# Patient Record
Sex: MALE | Race: BLACK OR AFRICAN AMERICAN
[De-identification: names, ages, dates, MRNs, and addresses within clinical notes are randomized per-mention and may not be internally consistent; named-entity substitution may affect disease eponyms.]

---

## 2018-05-22 ENCOUNTER — HOSPITAL ENCOUNTER (OUTPATIENT)
Dept: HOSPITAL 74 - JER | Age: 58
Setting detail: OBSERVATION
LOS: 3 days | Discharge: HOME | End: 2018-05-25
Attending: INTERNAL MEDICINE | Admitting: INTERNAL MEDICINE
Payer: COMMERCIAL

## 2018-05-22 VITALS — BODY MASS INDEX: 34.9 KG/M2

## 2018-05-22 DIAGNOSIS — G89.29: ICD-10-CM

## 2018-05-22 DIAGNOSIS — I10: ICD-10-CM

## 2018-05-22 DIAGNOSIS — N17.9: ICD-10-CM

## 2018-05-22 DIAGNOSIS — F10.129: ICD-10-CM

## 2018-05-22 DIAGNOSIS — M62.82: ICD-10-CM

## 2018-05-22 DIAGNOSIS — D64.9: ICD-10-CM

## 2018-05-22 DIAGNOSIS — E87.0: ICD-10-CM

## 2018-05-22 DIAGNOSIS — Z91.14: ICD-10-CM

## 2018-05-22 DIAGNOSIS — Z88.8: ICD-10-CM

## 2018-05-22 DIAGNOSIS — R55: ICD-10-CM

## 2018-05-22 DIAGNOSIS — R07.9: Primary | ICD-10-CM

## 2018-05-22 DIAGNOSIS — Z79.891: ICD-10-CM

## 2018-05-22 LAB
ALBUMIN SERPL-MCNC: 3.6 G/DL (ref 3.4–5)
ALP SERPL-CCNC: 92 U/L (ref 45–117)
ALT SERPL-CCNC: 47 U/L (ref 12–78)
ANION GAP SERPL CALC-SCNC: 9 MMOL/L (ref 8–16)
APPEARANCE UR: (no result)
AST SERPL-CCNC: 67 U/L (ref 15–37)
BASOPHILS # BLD: 1.1 % (ref 0–2)
BILIRUB SERPL-MCNC: 0.2 MG/DL (ref 0.2–1)
BILIRUB UR STRIP.AUTO-MCNC: NEGATIVE MG/DL
BNP SERPL-MCNC: 152 PG/ML (ref 5–125)
BUN SERPL-MCNC: 16 MG/DL (ref 7–18)
CALCIUM SERPL-MCNC: 8.1 MG/DL (ref 8.5–10.1)
CHLORIDE SERPL-SCNC: 114 MMOL/L (ref 98–107)
CO2 SERPL-SCNC: 26 MMOL/L (ref 21–32)
COLOR UR: (no result)
CREAT SERPL-MCNC: 1.7 MG/DL (ref 0.7–1.3)
DEPRECATED RDW RBC AUTO: 13.9 % (ref 11.9–15.9)
EOSINOPHIL # BLD: 1.4 % (ref 0–4.5)
GLUCOSE SERPL-MCNC: 92 MG/DL (ref 74–106)
HCT VFR BLD CALC: 44.2 % (ref 35.4–49)
HGB BLD-MCNC: 14.7 GM/DL (ref 11.7–16.9)
INR BLD: 0.91 (ref 0.82–1.09)
KETONES UR QL STRIP: NEGATIVE
LEUKOCYTE ESTERASE UR QL STRIP.AUTO: NEGATIVE
LYMPHOCYTES # BLD: 43.6 % (ref 8–40)
MAGNESIUM SERPL-MCNC: 2.2 MG/DL (ref 1.8–2.4)
MCH RBC QN AUTO: 27.6 PG (ref 25.7–33.7)
MCHC RBC AUTO-ENTMCNC: 33.2 G/DL (ref 32–35.9)
MCV RBC: 83.2 FL (ref 80–96)
MONOCYTES # BLD AUTO: 7.8 % (ref 3.8–10.2)
MUCOUS THREADS URNS QL MICRO: (no result)
NEUTROPHILS # BLD: 46.1 % (ref 42.8–82.8)
NITRITE UR QL STRIP: NEGATIVE
PH UR: 5 [PH] (ref 5–8)
PLATELET # BLD AUTO: 221 K/MM3 (ref 134–434)
PMV BLD: 11 FL (ref 7.5–11.1)
POTASSIUM SERPLBLD-SCNC: 3.9 MMOL/L (ref 3.5–5.1)
PROT SERPL-MCNC: 7.8 G/DL (ref 6.4–8.2)
PROT UR QL STRIP: (no result)
PROT UR QL STRIP: (no result)
PT PNL PPP: 10.3 SEC (ref 9.7–13)
RBC # BLD AUTO: 5.31 M/MM3 (ref 4–5.6)
SODIUM SERPL-SCNC: 149 MMOL/L (ref 136–145)
SP GR UR: 1.02 (ref 1–1.03)
UROBILINOGEN UR STRIP-MCNC: NEGATIVE MG/DL (ref 0.2–1)
WBC # BLD AUTO: 6 K/MM3 (ref 4–10)

## 2018-05-22 PROCEDURE — A9502 TC99M TETROFOSMIN: HCPCS

## 2018-05-22 PROCEDURE — 3E0337Z INTRODUCTION OF ELECTROLYTIC AND WATER BALANCE SUBSTANCE INTO PERIPHERAL VEIN, PERCUTANEOUS APPROACH: ICD-10-PCS | Performed by: INTERNAL MEDICINE

## 2018-05-22 PROCEDURE — 3E033GC INTRODUCTION OF OTHER THERAPEUTIC SUBSTANCE INTO PERIPHERAL VEIN, PERCUTANEOUS APPROACH: ICD-10-PCS | Performed by: INTERNAL MEDICINE

## 2018-05-22 PROCEDURE — G0378 HOSPITAL OBSERVATION PER HR: HCPCS

## 2018-05-22 RX ADMIN — METOPROLOL TARTRATE SCH MG: 50 TABLET, FILM COATED ORAL at 23:22
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
## 2018-05-22 NOTE — PDOC
History of Present Illness





- General


History Source: Patient


Exam Limitations: No Limitations





- History of Present Illness


Initial Comments: 





05/22/18 20:11


The patient is a 57 year old male, with a significant past medical history of 

HTN (noncompliant with medication) and anemia, who presents to the emergency 

department with, s/p 2 episodes of syncope while standing with associated chest 

pain. The patient reports associated chest pain with his second episode of 

syncope. He reports associated shortness of breath associated with both 

episodes of syncope. As per patient, he has not been feeling well for the past 

4 days.  He reports consuming alcohol the past two days after a friend advised 

him to. He denies consuming alcohol today. The patient reports to be 

noncompliant with his HTN medication because he believes it makes him feel 

lightheaded.  





He denies any recent fevers, chills, or headache. He denies any recent nausea, 

vomit, diarrhea or constipation. He denies any recent dysuria, frequency, 

urgency or hematuria.





Allergies: Morphine, Nitroglycerin, Tramadol, 


Social History: Nonsmoker. Denies recreational drug use. 








<Kenya Mathews - Last Filed: 05/22/18 20:30>





- General


History Source: Patient





<Mike Lemons - Last Filed: 05/22/18 22:40>





- General


Chief Complaint: Chest Pain


Stated Complaint: CHEST PAIN


Time Seen by Provider: 05/22/18 19:48





Past History





<Kenya Mathews - Last Filed: 05/22/18 20:30>





- Past Medical History


Anemia: Yes (2 TRANSFUSIONS)


COPD: No


HTN: Yes





- Immunization History


Immunization Up to Date: Yes





- Suicide/Smoking/Psychosocial Hx


Smoking Status: No


Smoking History: Never smoked


Have you smoked in the past 12 months: No


Number of Cigarettes Smoked Daily: 0


Hx Alcohol Use: Yes


Drug/Substance Use Hx: No


Substance Use Type: Alcohol, Opiates, Prescribed


Hx Substance Use Treatment: No





<Mike Lemons - Last Filed: 05/22/18 22:40>





- Past Medical History


Allergies/Adverse Reactions: 


 Allergies











Allergy/AdvReac Type Severity Reaction Status Date / Time


 


morphine Allergy   Verified 05/22/18 19:21


 


nitroglycerin Allergy   Verified 05/22/18 19:21


 


tramadol Allergy   Verified 05/22/18 19:21











Home Medications: 


Ambulatory Orders





Clonidine HCl 0 mg PO BID 04/19/16 


Hydrochlorothiazide [Hctz -] 0 mg PO DAILY 04/19/16 


Metoprolol Tartrate [Lopressor -] 50 mg PO BID 04/19/16 


Oxymorphone HCl [Opana ER] 40 mg PO BID 04/19/16 


oxyCODONE SR [Oxycontin] 15 mg PO TID 04/19/16 











**Review of Systems





- Review of Systems


Able to Perform ROS?: Yes


Comments:: 





05/22/18 20:11


CONSTITUTIONAL:


Absent: fever, no chills, no fatigue


EYES:


Absent: visual changes


ENT:


Absent: ear pain, no sore throat


CARDIOVASCULAR:


Present: Chest Pain.


Absent: no palpitations


RESPIRATORY:


Present: SOB


Absent: cough


GI:


Absent: abdominal pain, no nausea, no vomiting, no constipation, no diarrhea


GENITOURINARY:


Absent: dysuria, no frequency, no hematuria


MUSKULOSKELETAL:


Absent: back pain, no arthralgia, no myalgia


SKIN:


Absent: rash


NEURO:


Present: 2 Episodes of Syncope.


Absent: headache








<Kenya Mathews - Last Filed: 05/22/18 20:30>





*Physical Exam





- Vital Signs


 Last Vital Signs











Temp Pulse Resp BP Pulse Ox


 


 98.1 F   92 H  22   138/87   99 


 


 05/22/18 19:17  05/22/18 19:17  05/22/18 19:17  05/22/18 19:17  05/22/18 19:17














- Physical Exam


Comments: 





05/22/18 20:11


GENERAL: 


Well-appearing, well-nourished. No apparent distress.


HEENT: 


Normocephalic, atraumatic. PERRL, EOM intact.


CARDIOVASCULAR: 


Normal S1, S2. Regular rate and rhythm.


PULMONARY: 


Clear to auscultation bilaterally.


ABDOMEN: 


Soft, non-distended, non-tender. 


+EXTREMITIES: 


+1 pitting edema of the lower extremities. Normal ROM in all four extremities. 


SKIN: 


Warm, dry.  No rash


NEUROLOGICAL: 


Alert, awake, appropriate. Cranial nerves 2-12 intact. No deficits to light 

touch and temperature in face, upper extremities and lower extremities. No 

motor deficits in the in face, upper extremities and lower extremities. No 

pronator drift.  Normoreflexic in the upper and lower extremities. Normal 

speech. Toes are down-going bilaterally.














<Kenya Mathews - Last Filed: 05/22/18 20:30>





- Vital Signs


 Last Vital Signs











Temp Pulse Resp BP Pulse Ox


 


 98.1 F   92 H  22   138/87   99 


 


 05/22/18 19:17  05/22/18 19:17  05/22/18 19:17  05/22/18 19:17  05/22/18 19:17














<Mike Lemons - Last Filed: 05/22/18 22:40>





Moderate Sedation





- Procedure Monitoring


Vital Signs: 


 Vital Signs











Temp Pulse Resp BP Pulse Ox


 


 98.1 F   92 H  22   138/87   99 


 


 05/22/18 19:17  05/22/18 19:17  05/22/18 19:17  05/22/18 19:17  05/22/18 19:17














<Kenya Mathews - Last Filed: 05/22/18 20:30>





- Procedure Monitoring


Vital Signs: 


 Vital Signs











Temp Pulse Resp BP Pulse Ox


 


 98.1 F   92 H  22   138/87   99 


 


 05/22/18 19:17  05/22/18 19:17  05/22/18 19:17  05/22/18 19:17  05/22/18 19:17














<Mike Lemons - Last Filed: 05/22/18 22:40>





**Heart Score/ECG Review





- ECG Intrepretation


Comment:: 





05/22/18 20:12


EKG performed at: 22 May 2018  19:26:22





Vent Rate 90 bpm


ME interval  136 ms


QRS duration  98 ms 


QT/QTc  382/467 ms


P-R-T axes  47  51  -6








<Kenya Mathews - Last Filed: 05/22/18 20:30>





ED Treatment Course





- LABORATORY


CBC & Chemistry Diagram: 


 05/22/18 21:13





 05/22/18 21:13





<Mike Lemons - Last Filed: 05/22/18 22:40>





Medical Decision Making





- Medical Decision Making





05/22/18 22:36


Dr. Lemons: The scribe's documentation has been prepared under my direction 

and personally reviewed by me in its entirery. I confirm that the note above 

accurately reflects all work, treatment, procedures, and medical decision 

making performed by me.





Pt states feeling slightly better than before.  troponin x 1 negative at this 

time.  Pt recently returned from [place] and hasn't see a doctor in some 

time.  Will admit Tele Obs





<Mike Lemons - Last Filed: 05/22/18 22:40>





*DC/Admit/Observation/Transfer





- Attestations


Scribe Attestion: 





05/22/18 20:13





Documentation prepared by Kenya Mathews, acting as medical scribe for Mike Lemons DO.





<Kenya Mathews - Last Filed: 05/22/18 20:30>





- Discharge Dispostion


Decision to Admit order: Yes





<Mike Lemons - Last Filed: 05/22/18 22:40>


Diagnosis at time of Disposition: 


 Chest pain, Alcohol intoxication








- Discharge Dispostion


Condition at time of disposition: Stable
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
## 2018-05-23 LAB
ANION GAP SERPL CALC-SCNC: 9 MMOL/L (ref 8–16)
BUN SERPL-MCNC: 16 MG/DL (ref 7–18)
CALCIUM SERPL-MCNC: 8.1 MG/DL (ref 8.5–10.1)
CHLORIDE SERPL-SCNC: 112 MMOL/L (ref 98–107)
CO2 SERPL-SCNC: 25 MMOL/L (ref 21–32)
CREAT SERPL-MCNC: 1.3 MG/DL (ref 0.7–1.3)
DEPRECATED RDW RBC AUTO: 13.8 % (ref 11.9–15.9)
GLUCOSE SERPL-MCNC: 96 MG/DL (ref 74–106)
HCT VFR BLD CALC: 43.9 % (ref 35.4–49)
HGB BLD-MCNC: 14.4 GM/DL (ref 11.7–16.9)
MAGNESIUM SERPL-MCNC: 2 MG/DL (ref 1.8–2.4)
MCH RBC QN AUTO: 27.4 PG (ref 25.7–33.7)
MCHC RBC AUTO-ENTMCNC: 32.8 G/DL (ref 32–35.9)
MCV RBC: 83.4 FL (ref 80–96)
PHOSPHATE SERPL-MCNC: 2.9 MG/DL (ref 2.5–4.9)
PLATELET # BLD AUTO: 192 K/MM3 (ref 134–434)
PMV BLD: 10.1 FL (ref 7.5–11.1)
POTASSIUM SERPLBLD-SCNC: 3.8 MMOL/L (ref 3.5–5.1)
RBC # BLD AUTO: 5.27 M/MM3 (ref 4–5.6)
SODIUM SERPL-SCNC: 146 MMOL/L (ref 136–145)
WBC # BLD AUTO: 5.1 K/MM3 (ref 4–10)

## 2018-05-23 RX ADMIN — CYANOCOBALAMIN TAB 1000 MCG SCH MCG: 1000 TAB at 11:44

## 2018-05-23 RX ADMIN — Medication SCH MG: at 23:16

## 2018-05-23 RX ADMIN — AMLODIPINE BESYLATE SCH MG: 5 TABLET ORAL at 11:45

## 2018-05-23 RX ADMIN — Medication SCH MG: at 11:43

## 2018-05-23 RX ADMIN — FOLIC ACID SCH MG: 1 TABLET ORAL at 11:43

## 2018-05-23 RX ADMIN — METOPROLOL TARTRATE SCH MG: 50 TABLET, FILM COATED ORAL at 11:43

## 2018-05-23 RX ADMIN — METOPROLOL TARTRATE SCH MG: 50 TABLET, FILM COATED ORAL at 21:59

## 2018-05-23 RX ADMIN — OXYCODONE HYDROCHLORIDE SCH: 10 TABLET, FILM COATED, EXTENDED RELEASE ORAL at 10:56

## 2018-05-23 RX ADMIN — OXYCODONE HYDROCHLORIDE SCH: 10 TABLET, FILM COATED, EXTENDED RELEASE ORAL at 11:58

## 2018-05-23 NOTE — EKG
Test Reason : 

Blood Pressure : ***/*** mmHG

Vent. Rate : 090 BPM     Atrial Rate : 090 BPM

   P-R Int : 136 ms          QRS Dur : 098 ms

    QT Int : 382 ms       P-R-T Axes : 047 051 -06 degrees

   QTc Int : 467 ms

 

SINUS RHYTHM WITH FREQUENT PREMATURE VENTRICULAR COMPLEXES

MINIMAL VOLTAGE CRITERIA FOR LVH, MAY BE NORMAL VARIANT

NONSPECIFIC T WAVE ABNORMALITY

PROLONGED QT

ABNORMAL ECG

WHEN COMPARED WITH ECG OF 19-APR-2016 15:52,

NO SIGNIFICANT CHANGE WAS FOUND

Confirmed by EPHRAIM ZARAGOZA MD ([id number]) on 5/23/2018 10:27:08 AM

 

Referred By:             Confirmed By:EPHRAIM ZARAGOZA MD

## 2018-05-23 NOTE — EKG
Test Reason : 

Blood Pressure : ***/*** mmHG

Vent. Rate : 080 BPM     Atrial Rate : 080 BPM

   P-R Int : 146 ms          QRS Dur : 106 ms

    QT Int : 378 ms       P-R-T Axes : 040 033 -23 degrees

   QTc Int : 435 ms

 

SINUS RHYTHM WITH FREQUENT PREMATURE VENTRICULAR COMPLEXES

NONSPECIFIC ST AND T WAVE ABNORMALITY

ABNORMAL ECG

NO PREVIOUS ECGS AVAILABLE

Confirmed by EPHRAIM ZARAGOZA MD ([id number]) on 5/23/2018 10:27:39 AM

 

Referred By:             Confirmed By:EPHRAIM ZARAGOZA MD
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
## 2018-05-23 NOTE — CON.CARD
Consult


Consult Specialty:: Cardiology


Reason for Consultation:: syncope





- History of Present Illness


Chief Complaint: syncope


History of Present Illness: 





The patient is a 57 year old male, with a significant past medical history of 

HTN (noncompliant with medication) and anemia, who presents to the emergency 

department with, s/p 2 episodes of syncope while standing with associated chest 

pain. The patient reports associated chest pain with his second episode of 

syncope. He reports associated shortness of breath associated with both 

episodes of syncope. As per patient, he has not been feeling well for the past 

4 days.  He reports consuming alcohol the past two days after a friend advised 

him to. He denies consuming alcohol today. The patient reports to be 

noncompliant with his HTN medication because he believes it makes him feel 

lightheaded.  





He denies any recent fevers, chills, or headache. He denies any recent nausea, 

vomit, diarrhea or constipation. He denies any recent dysuria, frequency, 

urgency or hematuria.








- History Source


History Provided By: Patient, Medical Record





- Past Medical History


Cardio/Vascular: Yes: HTN





- Alcohol/Substance Use


Hx Alcohol Use: Yes





- Smoking History


Smoking history: Never smoked


Have you smoked in the past 12 months: No


Aproximately how many cigarettes per day: 0





- Social History


History of Recent Travel: Yes ([place] driving)





Home Medications





- Allergies


Allergies/Adverse Reactions: 


 Allergies











Allergy/AdvReac Type Severity Reaction Status Date / Time


 


morphine Allergy   Verified 05/22/18 19:21


 


nitroglycerin Allergy   Verified 05/22/18 19:21


 


tramadol Allergy   Verified 05/22/18 19:21














- Home Medications


Home Medications: 


Ambulatory Orders





Clonidine HCl 0 mg PO BID 04/19/16 


Hydrochlorothiazide [Hctz -] 0 mg PO DAILY 04/19/16 


Metoprolol Tartrate [Lopressor -] 50 mg PO BID 04/19/16 


Oxymorphone HCl [Opana ER] 40 mg PO BID 04/19/16 


oxyCODONE SR [Oxycontin] 15 mg PO TID 04/19/16 











Review of Systems





- Review of Systems


Constitutional: reports: No Symptoms


Eyes: reports: No Symptoms


HENT: reports: No Symptoms


Neck: reports: No Symptoms


Cardiovascular: reports: Chest Pain


Gastrointestinal: reports: No Symptoms


Genitourinary: reports: No Symptoms


Breasts: reports: No Symptoms Reported


Musculoskeletal: reports: No Symptoms


Integumentary: reports: No Symptoms


Neurological: reports: Syncope


Endocrine: reports: No Symptoms


Hematology/Lymphatic: reports: No Symptoms


Psychiatric: reports: No Symptoms


Vital Signs: 


 Vital Signs











Temperature  98.2 F   05/23/18 11:09


 


Pulse Rate  62   05/23/18 11:09


 


Respiratory Rate  18   05/23/18 11:09


 


Blood Pressure  196/119   05/23/18 11:09


 


O2 Sat by Pulse Oximetry (%)  98   05/23/18 11:09











Constitutional: Yes: Well Nourished, No Distress, Calm


Eyes: Yes: WNL, Conjunctiva Clear, EOM Intact


HENT: Yes: WNL, Atraumatic, Normocephalic


Neck: Yes: WNL, Supple, Trachea Midline


Respiratory: Yes: WNL, Regular, CTA Bilaterally


Gastrointestinal: Yes: WNL, Normal Bowel Sounds


Renal/: Yes: WNL


Cardiovascular: Yes: WNL, Regular Rate and Rhythm


Musculoskeletal: Yes: WNL


Extremities: Yes: WNL


Integumentary: Yes: WNL


Neurological: Yes: WNL, Alert, Oriented


...Motor Strength: WNL


Psychiatric: Yes: WNL, Alert, Oriented





- Other Data


Labs, Other Data: 


 CBC, BMP





 05/23/18 08:12 





 05/23/18 08:12 





 INR, PTT











INR  0.91  (0.82-1.09)   05/22/18  21:13    








 Troponin, BNP











  05/22/18 05/23/18





  21:13 08:12


 


Troponin I  < 0.02  < 0.02


 


B-Natriuretic Peptide  152.00 H 








 Troponin, BNP











  05/22/18 05/23/18





  21:13 08:12


 


Troponin I  < 0.02  < 0.02


 


B-Natriuretic Peptide  152.00 H 








 Laboratory Tests











  05/22/18 05/22/18 05/22/18





  21:13 21:13 21:13


 


WBC  6.0  D  


 


RBC  5.31  


 


Hgb  14.7  


 


Hct  44.2  


 


MCV  83.2  


 


MCH  27.6  


 


MCHC  33.2  


 


RDW  13.9  


 


Plt Count  221  D  


 


MPV  11.0  D  


 


Neutrophils %  46.1  D  


 


Lymphocytes %  43.6 H D  


 


Monocytes %  7.8  


 


Eosinophils %  1.4  D  


 


Basophils %  1.1  


 


Nucleated RBC %  0  


 


PT with INR   10.30 


 


INR   0.91 


 


Sodium   


 


Potassium   


 


Chloride   


 


Carbon Dioxide   


 


Anion Gap   


 


BUN   


 


Creatinine   


 


Creat Clearance w eGFR   


 


Random Glucose   


 


Calcium   


 


Phosphorus   


 


Magnesium   


 


Total Bilirubin   


 


AST   


 


ALT   


 


Alkaline Phosphatase   


 


Creatine Kinase   


 


Creatine Kinase Index   


 


CK-MB (CK-2)   


 


Troponin I   


 


B-Natriuretic Peptide   


 


Total Protein   


 


Albumin   


 


Urine Color    Ltyellow


 


Urine Appearance    Slcloudy


 


Urine pH    5.0


 


Ur Specific Gravity    1.018


 


Urine Protein    2+ H


 


Urine Glucose (UA)    1+ H


 


Urine Ketones    Negative


 


Urine Blood    1+ H


 


Urine Nitrite    Negative


 


Urine Bilirubin    Negative


 


Urine Urobilinogen    Negative


 


Ur Leukocyte Esterase    Negative


 


Urine WBC (Auto)    <1


 


Urine RBC (Auto)    <1


 


Urine Mucus    Rare


 


Alcohol, Quantitative   


 


Blood Type   


 


Antibody Screen   














  05/22/18 05/22/18 05/22/18





  21:13 21:13 21:13


 


WBC   


 


RBC   


 


Hgb   


 


Hct   


 


MCV   


 


MCH   


 


MCHC   


 


RDW   


 


Plt Count   


 


MPV   


 


Neutrophils %   


 


Lymphocytes %   


 


Monocytes %   


 


Eosinophils %   


 


Basophils %   


 


Nucleated RBC %   


 


PT with INR   


 


INR   


 


Sodium  149 H  


 


Potassium  3.9  


 


Chloride  114 H  


 


Carbon Dioxide  26  


 


Anion Gap  9  


 


BUN  16  


 


Creatinine  1.7 H D  


 


Creat Clearance w eGFR  41.90  


 


Random Glucose  92  


 


Calcium  8.1 L  


 


Phosphorus   


 


Magnesium  2.2  


 


Total Bilirubin  0.2  D  


 


AST  67 H  


 


ALT  47  


 


Alkaline Phosphatase  92  


 


Creatine Kinase  1039 H  


 


Creatine Kinase Index  1.1  


 


CK-MB (CK-2)  11.934 H  


 


Troponin I  < 0.02  


 


B-Natriuretic Peptide  152.00 H  


 


Total Protein  7.8  


 


Albumin  3.6  


 


Urine Color   


 


Urine Appearance   


 


Urine pH   


 


Ur Specific Gravity   


 


Urine Protein   


 


Urine Glucose (UA)   


 


Urine Ketones   


 


Urine Blood   


 


Urine Nitrite   


 


Urine Bilirubin   


 


Urine Urobilinogen   


 


Ur Leukocyte Esterase   


 


Urine WBC (Auto)   


 


Urine RBC (Auto)   


 


Urine Mucus   


 


Alcohol, Quantitative    203.71 H*


 


Blood Type   A NEGATIVE 


 


Antibody Screen   Negative 














  05/23/18 05/23/18 05/23/18





  08:12 08:12 08:12


 


WBC   5.1 


 


RBC   5.27 


 


Hgb   14.4 


 


Hct   43.9 


 


MCV   83.4 


 


MCH   27.4 


 


MCHC   32.8 


 


RDW   13.8 


 


Plt Count   192 


 


MPV   10.1 


 


Neutrophils %   


 


Lymphocytes %   


 


Monocytes %   


 


Eosinophils %   


 


Basophils %   


 


Nucleated RBC %   


 


PT with INR   


 


INR   


 


Sodium    146 H


 


Potassium    3.8


 


Chloride    112 H


 


Carbon Dioxide    25


 


Anion Gap    9


 


BUN    16


 


Creatinine    1.3  D


 


Creat Clearance w eGFR   


 


Random Glucose    96


 


Calcium    8.1 L


 


Phosphorus    2.9  D


 


Magnesium    2.0


 


Total Bilirubin   


 


AST   


 


ALT   


 


Alkaline Phosphatase   


 


Creatine Kinase   


 


Creatine Kinase Index   


 


CK-MB (CK-2)   


 


Troponin I  < 0.02  


 


B-Natriuretic Peptide   


 


Total Protein   


 


Albumin   


 


Urine Color   


 


Urine Appearance   


 


Urine pH   


 


Ur Specific Gravity   


 


Urine Protein   


 


Urine Glucose (UA)   


 


Urine Ketones   


 


Urine Blood   


 


Urine Nitrite   


 


Urine Bilirubin   


 


Urine Urobilinogen   


 


Ur Leukocyte Esterase   


 


Urine WBC (Auto)   


 


Urine RBC (Auto)   


 


Urine Mucus   


 


Alcohol, Quantitative   


 


Blood Type   


 


Antibody Screen   














Imaging





- Results


Chest X-ray: Image Reviewed (no i/e)


Ultrasound: Report Reviewed (normal carotid us)


EKG: Image Reviewed (sr vpcs repolarization abnormalities)





Problem List





- Problems


(1) Alcohol intoxication


Code(s): F10.929 - ALCOHOL USE, UNSPECIFIED WITH INTOXICATION, UNSPECIFIED   





(2) Chest pain


Code(s): R07.9 - CHEST PAIN, UNSPECIFIED   





(3) Hypertension


Code(s): I10 - ESSENTIAL (PRIMARY) HYPERTENSION   


Qualifiers: 


   Hypertension type: essential hypertension   Qualified Code(s): I10 - 

Essential (primary) hypertension   





Assessment/Plan





syncope


r/o mi


htn


etoh use


noncompliance


plan


bp control amlodipine


check lipids


watch for DT


echo


telemetry


mibi st when stable

## 2018-05-23 NOTE — PN
Physical Exam: 


SUBJECTIVE: Patient seen and examined. Pt came in with repeated syncopal 

episodes following chest pain. Had similar symptoms and was investigated in the 

past here.








OBJECTIVE:





 Vital Signs











 Period  Temp  Pulse  Resp  BP Sys/Vela  Pulse Ox


 


 Last 24 Hr  98.1 F  92  22  138/87  99





 Vital Signs











Temp  98.2 F   05/23/18 11:09


 


Pulse  62   05/23/18 11:09


 


Resp  18   05/23/18 11:09


 


BP  196/119   05/23/18 11:09


 


Pulse Ox  98   05/23/18 11:09








 








GENERAL: The patient is awake, alert, and fully oriented, in no acute distress. 

Absent tremors


NECK:  full range of motion, supple, no JVD. 


LUNGS: Breath sounds equal, clear to auscultation bilaterally, no wheezes


HEART: Regular rate and rhythm, S1, S2 without murmur, rub or gallop.


ABDOMEN: Soft, nontender, nondistended, normoactive bowel sounds


EXTREMITIES: Multiple scars >> LLE , no edema. 


NEUROLOGICAL: Cranial nerves II through XII grossly intact. No facial droop, 

Muscle strength 5/5  both UE. Normal speech, gait not observed.








 CBC, BMP





 05/23/18 08:12 





 05/23/18 08:12 





 Abnormal Lab Results











  05/22/18 05/22/18 05/22/18





  21:13 21:13 21:13


 


Lymphocytes %  43.6 H D  


 


Sodium    149 H


 


Chloride    114 H


 


Creatinine    1.7 H D


 


Calcium    8.1 L


 


AST    67 H


 


Creatine Kinase    1039 H


 


CK-MB (CK-2)    11.934 H


 


B-Natriuretic Peptide    152.00 H


 


Urine Protein   2+ H 


 


Urine Glucose (UA)   1+ H 


 


Urine Blood   1+ H 


 


Alcohol, Quantitative   














  05/22/18 05/23/18





  21:13 08:12


 


Lymphocytes %  


 


Sodium   146 H


 


Chloride   112 H


 


Creatinine  


 


Calcium   8.1 L


 


AST  


 


Creatine Kinase  


 


CK-MB (CK-2)  


 


B-Natriuretic Peptide  


 


Urine Protein  


 


Urine Glucose (UA)  


 


Urine Blood  


 


Alcohol, Quantitative  203.71 H* 

















 Laboratory Results - last 24 hr











  05/22/18 05/22/18 05/22/18





  21:13 21:13 21:13


 


WBC  6.0  D  


 


RBC  5.31  


 


Hgb  14.7  


 


Hct  44.2  


 


MCV  83.2  


 


MCH  27.6  


 


MCHC  33.2  


 


RDW  13.9  


 


Plt Count  221  D  


 


MPV  11.0  D  


 


Neutrophils %  46.1  D  


 


Lymphocytes %  43.6 H D  


 


Monocytes %  7.8  


 


Eosinophils %  1.4  D  


 


Basophils %  1.1  


 


Nucleated RBC %  0  


 


PT with INR   10.30 


 


INR   0.91 


 


Sodium   


 


Potassium   


 


Chloride   


 


Carbon Dioxide   


 


Anion Gap   


 


BUN   


 


Creatinine   


 


Creat Clearance w eGFR   


 


Random Glucose   


 


Calcium   


 


Magnesium   


 


Total Bilirubin   


 


AST   


 


ALT   


 


Alkaline Phosphatase   


 


Creatine Kinase   


 


Creatine Kinase Index   


 


CK-MB (CK-2)   


 


Troponin I   


 


B-Natriuretic Peptide   


 


Total Protein   


 


Albumin   


 


Urine Color    Ltyellow


 


Urine Appearance    Slcloudy


 


Urine pH    5.0


 


Ur Specific Gravity    1.018


 


Urine Protein    2+ H


 


Urine Glucose (UA)    1+ H


 


Urine Ketones    Negative


 


Urine Blood    1+ H


 


Urine Nitrite    Negative


 


Urine Bilirubin    Negative


 


Urine Urobilinogen    Negative


 


Ur Leukocyte Esterase    Negative


 


Urine WBC (Auto)    <1


 


Urine RBC (Auto)    <1


 


Urine Mucus    Rare


 


Alcohol, Quantitative   


 


Blood Type   


 


Antibody Screen   














  05/22/18 05/22/18 05/22/18





  21:13 21:13 21:13


 


WBC   


 


RBC   


 


Hgb   


 


Hct   


 


MCV   


 


MCH   


 


MCHC   


 


RDW   


 


Plt Count   


 


MPV   


 


Neutrophils %   


 


Lymphocytes %   


 


Monocytes %   


 


Eosinophils %   


 


Basophils %   


 


Nucleated RBC %   


 


PT with INR   


 


INR   


 


Sodium  149 H  


 


Potassium  3.9  


 


Chloride  114 H  


 


Carbon Dioxide  26  


 


Anion Gap  9  


 


BUN  16  


 


Creatinine  1.7 H D  


 


Creat Clearance w eGFR  41.90  


 


Random Glucose  92  


 


Calcium  8.1 L  


 


Magnesium  2.2  


 


Total Bilirubin  0.2  D  


 


AST  67 H  


 


ALT  47  


 


Alkaline Phosphatase  92  


 


Creatine Kinase  1039 H  


 


Creatine Kinase Index  1.1  


 


CK-MB (CK-2)  11.934 H  


 


Troponin I  < 0.02  


 


B-Natriuretic Peptide  152.00 H  


 


Total Protein  7.8  


 


Albumin  3.6  


 


Urine Color   


 


Urine Appearance   


 


Urine pH   


 


Ur Specific Gravity   


 


Urine Protein   


 


Urine Glucose (UA)   


 


Urine Ketones   


 


Urine Blood   


 


Urine Nitrite   


 


Urine Bilirubin   


 


Urine Urobilinogen   


 


Ur Leukocyte Esterase   


 


Urine WBC (Auto)   


 


Urine RBC (Auto)   


 


Urine Mucus   


 


Alcohol, Quantitative    203.71 H*


 


Blood Type   A NEGATIVE 


 


Antibody Screen   Negative 








Active Medications











Generic Name Dose Route Start Last Admin





  Trade Name Freq  PRN Reason Stop Dose Admin


 


Clonidine  0.1 mg  05/23/18 10:00  





  Catapres -  PO   





  BID SHAYNE   





     





     





     





     


 


Sodium Chloride  1,000 mls @ 100 mls/hr  05/22/18 23:15  05/22/18 23:22





  Normal Saline -  IV   100 mls/hr





  ASDIR SHAYNE   Administration





     





     





     





     


 


Lorazepam  2 mg  05/23/18 00:54  





  Ativan -  PO   





  Q8H PRN   





  AGITATION/ANXIETY   





     





     





     


 


Metoprolol Tartrate  50 mg  05/22/18 23:30  05/22/18 23:22





  Lopressor -  PO   50 mg





  BID SHAYNE   Administration





     





     





     





     


 


Oxycodone HCl  10 mg  05/22/18 23:45  





  Oxycontin -  PO   





  TID SHAYNE   





     





     





     





     








Ambulatory Orders





Clonidine HCl 0.2 mg PO BID 04/19/16 


Metoprolol Tartrate [Lopressor -] 25 mg PO BID 04/19/16 


oxyCODONE SR [Oxycontin] 10 mg PO TID 04/19/16 


HYDROmorphone [Dilaudid -] 8 mg PO DAILY 05/23/18 


Ondansetron HCl [Zofran] 4 mg PO DAILY 05/23/18 








ECHO 5/23/18: Normal LVEF, moderate concentric LVH. E/A reversal consistent but 

not diagnostic of poor LV compliance. LV wall motion normal. Trace MR, Mild RI, 

Mild TR,RV systolic pressure 40-50





ASSESSMENT/PLAN:


57 year old male with a hx of HTN and anemia presents for multiple episodes of 

syncope and chest pain





#Syncopal Episodes: patient reports 8 within the last 2 weeks


-ASA given in ED


-EKG Sinus rhythm with PVCs, prolonged QTc 467


-Consult Dr. Sun appreciated


-echocardiogram- see above


- carotid doppler- -ve


-continue ASA 81mg


-orthostatic vital signs





#Chest pain: r/o ACS


-repeat trops


-repeat EKG- PVCs


-Elevated creatinine kinase


-likely dehydration related





#Chronic pain


-Pt on chronic opioids 


-Confirmed he follows a pain mx clinic in [facility]


-Has one month supply of dilaudid 8mg ER (filled 5/11/18)


-Also on oxycodone 10mg Q8H


-Will resume oxycodone for now and monitor


-Pt is on Ativan PRN





#Hypernatremia: likely dehydration related


-IV NS @ 100cc/hr


-recheck BMP in AM





#WILBERTO: new in onset, creatinine 1.7, possibly prerenal in origin


-urine lytes (creatinine and sodium)


-calculate FENa


-IV NS @ 100cc/hr





#Alcohol Use: 


-patient says last drink was after the syncope yesterday


-Blood alcohol level above 200


-ativan PRN for withdrawal symptoms





#FEN


-IV NS @ 100cc/hr


-replete lytes as necessary in AM


-sodium controlled diet





#Prophylaxis


-heparin prophylaxis





#Disposition


-admit obs tele





Visit type





- Emergency Visit


Emergency Visit: Yes


ED Registration Date: 05/22/18


Care time: The patient presented to the Emergency Department on the above date 

and was hospitalized for further evaluation of their emergent condition.





- New Patient


This patient is new to me today: Yes


Date on this admission: 05/23/18





- Critical Care


Critical Care patient: No





- Discharge Referral


Referred to [facility] No

## 2018-05-24 LAB
ALBUMIN SERPL-MCNC: 2.8 G/DL (ref 3.4–5)
ALP SERPL-CCNC: 67 U/L (ref 45–117)
ALT SERPL-CCNC: 34 U/L (ref 12–78)
AMPHET UR-MCNC: NEGATIVE NG/ML
ANION GAP SERPL CALC-SCNC: 6 MMOL/L (ref 8–16)
AST SERPL-CCNC: 36 U/L (ref 15–37)
BARBITURATES UR-MCNC: NEGATIVE NG/ML
BASOPHILS # BLD: 1.1 % (ref 0–2)
BENZODIAZ UR SCN-MCNC: NEGATIVE NG/ML
BILIRUB SERPL-MCNC: 0.6 MG/DL (ref 0.2–1)
BUN SERPL-MCNC: 17 MG/DL (ref 7–18)
CALCIUM SERPL-MCNC: 7.4 MG/DL (ref 8.5–10.1)
CHLORIDE SERPL-SCNC: 110 MMOL/L (ref 98–107)
CHOLEST SERPL-MCNC: 152 MG/DL (ref 50–200)
CO2 SERPL-SCNC: 27 MMOL/L (ref 21–32)
COCAINE UR-MCNC: NEGATIVE NG/ML
CREAT SERPL-MCNC: 1.2 MG/DL (ref 0.7–1.3)
DEPRECATED RDW RBC AUTO: 14.1 % (ref 11.9–15.9)
EOSINOPHIL # BLD: 3.5 % (ref 0–4.5)
GLUCOSE SERPL-MCNC: 83 MG/DL (ref 74–106)
HCT VFR BLD CALC: 41.2 % (ref 35.4–49)
HDLC SERPL-MCNC: 63 MG/DL (ref 40–60)
HGB BLD-MCNC: 13.7 GM/DL (ref 11.7–16.9)
LYMPHOCYTES # BLD: 36.6 % (ref 8–40)
MAGNESIUM SERPL-MCNC: 1.8 MG/DL (ref 1.8–2.4)
MCH RBC QN AUTO: 27.8 PG (ref 25.7–33.7)
MCHC RBC AUTO-ENTMCNC: 33.3 G/DL (ref 32–35.9)
MCV RBC: 83.6 FL (ref 80–96)
METHADONE UR-MCNC: NEGATIVE NG/ML
MONOCYTES # BLD AUTO: 9.5 % (ref 3.8–10.2)
NEUTROPHILS # BLD: 49.3 % (ref 42.8–82.8)
OPIATES UR QL SCN: POSITIVE NG/ML
PCP UR QL SCN: NEGATIVE NG/ML
PHOSPHATE SERPL-MCNC: 2.9 MG/DL (ref 2.5–4.9)
PLATELET # BLD AUTO: 166 K/MM3 (ref 134–434)
PMV BLD: 11 FL (ref 7.5–11.1)
POTASSIUM SERPLBLD-SCNC: 3.6 MMOL/L (ref 3.5–5.1)
PROT SERPL-MCNC: 6.2 G/DL (ref 6.4–8.2)
RBC # BLD AUTO: 4.93 M/MM3 (ref 4–5.6)
SODIUM SERPL-SCNC: 143 MMOL/L (ref 136–145)
TRIGL SERPL-MCNC: 84 MG/DL (ref 35–160)
WBC # BLD AUTO: 6 K/MM3 (ref 4–10)

## 2018-05-24 RX ADMIN — HEPARIN SODIUM SCH UNIT: 5000 INJECTION, SOLUTION INTRAVENOUS; SUBCUTANEOUS at 21:03

## 2018-05-24 RX ADMIN — METOPROLOL TARTRATE SCH MG: 50 TABLET, FILM COATED ORAL at 21:02

## 2018-05-24 RX ADMIN — Medication SCH MG: at 14:13

## 2018-05-24 RX ADMIN — AMLODIPINE BESYLATE SCH MG: 5 TABLET ORAL at 05:56

## 2018-05-24 RX ADMIN — CYANOCOBALAMIN TAB 1000 MCG SCH MCG: 1000 TAB at 14:14

## 2018-05-24 RX ADMIN — AMLODIPINE BESYLATE SCH MG: 5 TABLET ORAL at 14:13

## 2018-05-24 RX ADMIN — HEPARIN SODIUM SCH UNIT: 5000 INJECTION, SOLUTION INTRAVENOUS; SUBCUTANEOUS at 14:13

## 2018-05-24 RX ADMIN — Medication SCH MG: at 21:03

## 2018-05-24 RX ADMIN — FOLIC ACID SCH MG: 1 TABLET ORAL at 14:13

## 2018-05-24 RX ADMIN — METOPROLOL TARTRATE SCH: 50 TABLET, FILM COATED ORAL at 14:14

## 2018-05-24 NOTE — PN
Progress Note, Physician


Chief Complaint: 





Pt A&Ox3; ambulatory; just returned from stress MIBI.No chest pain, dizziness, 

or dyspnea.


History of Present Illness: 





The patient is a 57 year old male, with a significant past medical history of 

HTN (noncompliant with medication) and anemia, who presents to the emergency 

department with, s/p 2 episodes of syncope while standing with associated chest 

pain. The patient reports associated chest pain with his second episode of 

syncope. He reports associated shortness of breath associated with both 

episodes of syncope. As per patient, he has not been feeling well for the past 

4 days.  He reports consuming alcohol the past two days after a friend advised 

him to. He denies consuming alcohol today. The patient reports to be 

noncompliant with his HTN medication because he believes it makes him feel 

lightheaded.  








- Current Medication List


Current Medications: 


Active Medications





Amlodipine Besylate (Norvasc -)  5 mg PO DAILY [facility]


   Last Admin: 05/24/18 05:56 Dose:  5 mg


Clonidine (Catapres -)  0.1 mg PO BID [facility]


   Last Admin: 05/24/18 12:07 Dose:  0.1 mg


Cyanocobalamin (Vitamin B12 -)  1,000 mcg PO DAILY [facility]


   Last Admin: 05/23/18 11:44 Dose:  1,000 mcg


Folic Acid (Folic Acid -)  1 mg PO DAILY [facility]


   Last Admin: 05/23/18 11:43 Dose:  1 mg


Heparin Sodium (Porcine) (Heparin -)  5,000 unit SQ TID [facility]


Hydromorphone HCl (Dilaudid -)  4 mg PO BID [facility]


Metoprolol Tartrate (Lopressor -)  50 mg PO BID [facility]


   Last Admin: 05/23/18 21:59 Dose:  50 mg


Oxycodone HCl (Roxicodone -)  10 mg PO Q8H PRN


   PRN Reason: PAIN LEVEL 4 - 6


Thiamine HCl (Vitamin B1 -)  100 mg PO BID [facility]


   Last Admin: 05/23/18 23:16 Dose:  100 mg











- Objective


Vital Signs: 


 Vital Signs











Temperature  98.1 F   05/24/18 07:54


 


Pulse Rate  67   05/24/18 07:54


 


Respiratory Rate  20   05/24/18 07:57


 


Blood Pressure  160/108   05/24/18 13:43


 


O2 Sat by Pulse Oximetry (%)  97   05/24/18 07:57











Labs: 


 CBC, BMP





 05/24/18 06:10 





 05/24/18 06:10 





 INR, PTT











INR  0.91  (0.82-1.09)   05/22/18  21:13    














Problem List





- Problems


(1) Alcohol intoxication


Code(s): F10.929 - ALCOHOL USE, UNSPECIFIED WITH INTOXICATION, UNSPECIFIED   





(2) Chest pain


Assessment/Plan: 


Multiple CAD risks.


Stress MIBI results pending.


Code(s): R07.9 - CHEST PAIN, UNSPECIFIED   





(3) Hypertension


Assessment/Plan: 


On multiple antihypertensives: 


Prolematic using both clonidine and beta blockers.


Pt claims compliance with bid s=dosing; the dangers of not taking clonidine 

continuously were discussed.





Code(s): I10 - ESSENTIAL (PRIMARY) HYPERTENSION   


Qualifiers: 


   Hypertension type: essential hypertension   Qualified Code(s): I10 - 

Essential (primary) hypertension

## 2018-05-24 NOTE — PN
Physical Exam: 


SUBJECTIVE: Patient seen and examined. Still having intermittent palpitations 

with SOB. Last episode about 2am. Had episodes of high blood pressure overnight.








OBJECTIVE:





 Vital Signs











 Period  Temp  Pulse  Resp  BP Sys/Vela  Pulse Ox


 


 Last 24 Hr  97.6 F-98.2 F  54-62  18-20  146-196/  97-98








 Vital Signs











Temp  98.1 F   05/24/18 02:00


 


Pulse  54 L  05/24/18 02:00


 


Resp  20   05/24/18 02:00


 


BP  146/99   05/24/18 02:00


 


Pulse Ox  97   05/24/18 03:48








 





GENERAL: The patient is awake, alert, and fully oriented, in no acute distress. 

No tremors.


LUNGS: Breath sounds equal, clear to auscultation bilaterally


HEART: irregular rate and normal rhythm, S1, S2 without murmur, rub or gallop.


ABDOMEN: Soft, nontender, nondistended, normoactive bowel sounds


EXTREMITIES: 2+ pulses, warm, well-perfused, no edema. Multiple healed scars 

lower extremities


NEUROLOGICAL: Cranial nerves II through XII grossly intact. Normal speech, gait 

not 


observed.


PSYCH: Normal mood, normal affect.


SKIN: Warm, dry, normal turgor, no rashes or lesions noted








 CBC, BMP





 05/24/18 06:10 





 05/24/18 06:10 











 Laboratory Results - last 24 hr











  05/23/18 05/23/18 05/23/18





  08:12 08:12 08:12


 


WBC   5.1 


 


RBC   5.27 


 


Hgb   14.4 


 


Hct   43.9 


 


MCV   83.4 


 


MCH   27.4 


 


MCHC   32.8 


 


RDW   13.8 


 


Plt Count   192 


 


MPV   10.1 


 


Neutrophils %   


 


Lymphocytes %   


 


Monocytes %   


 


Eosinophils %   


 


Basophils %   


 


Nucleated RBC %   


 


Sodium    146 H


 


Potassium    3.8


 


Chloride    112 H


 


Carbon Dioxide    25


 


Anion Gap    9


 


BUN    16


 


Creatinine    1.3  D


 


Creat Clearance w eGFR   


 


Random Glucose    96


 


Calcium    8.1 L


 


Phosphorus    2.9  D


 


Magnesium    2.0


 


Total Bilirubin   


 


AST   


 


ALT   


 


Alkaline Phosphatase   


 


Creatine Kinase   


 


Troponin I  < 0.02  


 


Total Protein   


 


Albumin   


 


Ur Random Sodium   


 


Urine Creatinine   














  05/23/18 05/23/18 05/24/18





  21:37 21:37 06:10


 


WBC   


 


RBC   


 


Hgb   


 


Hct   


 


MCV   


 


MCH   


 


MCHC   


 


RDW   


 


Plt Count   


 


MPV   


 


Neutrophils %   


 


Lymphocytes %   


 


Monocytes %   


 


Eosinophils %   


 


Basophils %   


 


Nucleated RBC %   


 


Sodium    143


 


Potassium    3.6


 


Chloride    110 H


 


Carbon Dioxide    27


 


Anion Gap    6 L


 


BUN    17


 


Creatinine    1.2


 


Creat Clearance w eGFR    > 60


 


Random Glucose    83


 


Calcium    7.4 L


 


Phosphorus    2.9


 


Magnesium    1.8


 


Total Bilirubin    0.6  D


 


AST    36  D


 


ALT    34  D


 


Alkaline Phosphatase    67  D


 


Creatine Kinase    502 H


 


Troponin I   


 


Total Protein    6.2 L D


 


Albumin    2.8 L D


 


Ur Random Sodium  142  


 


Urine Creatinine   28.8 














  05/24/18





  06:10


 


WBC  6.0


 


RBC  4.93


 


Hgb  13.7


 


Hct  41.2


 


MCV  83.6


 


MCH  27.8


 


MCHC  33.3


 


RDW  14.1


 


Plt Count  166


 


MPV  11.0


 


Neutrophils %  49.3


 


Lymphocytes %  36.6


 


Monocytes %  9.5


 


Eosinophils %  3.5  D


 


Basophils %  1.1


 


Nucleated RBC %  0


 


Sodium 


 


Potassium 


 


Chloride 


 


Carbon Dioxide 


 


Anion Gap 


 


BUN 


 


Creatinine 


 


Creat Clearance w eGFR 


 


Random Glucose 


 


Calcium 


 


Phosphorus 


 


Magnesium 


 


Total Bilirubin 


 


AST 


 


ALT 


 


Alkaline Phosphatase 


 


Creatine Kinase 


 


Troponin I 


 


Total Protein 


 


Albumin 


 


Ur Random Sodium 


 


Urine Creatinine 








Active Medications











Generic Name Dose Route Start Last Admin





  Trade Name Freq  PRN Reason Stop Dose Admin


 


Amlodipine Besylate  5 mg  05/23/18 11:45  05/24/18 05:56





  Norvasc -  PO   5 mg





  DAILY SHAYNE   Administration





     





     





     





     


 


Clonidine  0.1 mg  05/23/18 10:00  05/23/18 21:59





  Catapres -  PO   0.1 mg





  BID SHAYNE   Administration





     





     





     





     


 


Cyanocobalamin  1,000 mcg  05/23/18 10:00  05/23/18 11:44





  Vitamin B12 -  PO   1,000 mcg





  DAILY SHAYNE   Administration





     





     





     





     


 


Folic Acid  1 mg  05/23/18 10:00  05/23/18 11:43





  Folic Acid -  PO   1 mg





  DAILY SHAYNE   Administration





     





     





     





     


 


Lorazepam  1 mg  05/23/18 08:23  05/23/18 23:16





  Ativan -  PO   1 mg





  Q8H PRN   Administration





  AGITATION/ANXIETY   





     





     





     


 


Metoprolol Tartrate  50 mg  05/22/18 23:30  05/23/18 21:59





  Lopressor -  PO   50 mg





  BID SHAYNE   Administration





     





     





     





     


 


Oxycodone HCl  10 mg  05/23/18 17:40  05/23/18 19:01





  Roxicodone -  PO   10 mg





  Q6H PRN   Administration





  PAIN LEVEL 4 - 6   





     





     





     


 


Thiamine HCl  100 mg  05/23/18 10:00  05/23/18 23:16





  Vitamin B1 -  PO   100 mg





  BID SHAYNE   Administration





     





     





     





     











Ambulatory Orders





Clonidine HCl 0.2 mg PO BID 04/19/16 


Metoprolol Tartrate [Lopressor -] 25 mg PO BID 04/19/16 


oxyCODONE SR [Oxycontin] 10 mg PO TID 04/19/16 


HYDROmorphone [Dilaudid -] 8 mg PO DAILY 05/23/18 


Ondansetron HCl [Zofran] 4 mg PO DAILY 05/23/18 





 Current Medications





Amlodipine Besylate (Norvasc -)  10 mg PO DAILY [facility]


Clonidine (Catapres -)  0.1 mg PO BID [facility]


   Last Admin: 05/24/18 12:07 Dose:  0.1 mg


Cyanocobalamin (Vitamin B12 -)  1,000 mcg PO DAILY [facility]


   Last Admin: 05/24/18 14:14 Dose:  1,000 mcg


Folic Acid (Folic Acid -)  1 mg PO DAILY [facility]


   Last Admin: 05/24/18 14:13 Dose:  1 mg


Heparin Sodium (Porcine) (Heparin -)  5,000 unit SQ TID [facility]


   Last Admin: 05/24/18 14:13 Dose:  5,000 unit


Hydromorphone HCl (Dilaudid -)  4 mg PO BID [facility]


   Last Admin: 05/24/18 14:12 Dose:  4 mg


Metoprolol Tartrate (Lopressor -)  50 mg PO BID [facility]


   Last Admin: 05/24/18 14:14 Dose:  Not Given


Oxycodone HCl (Roxicodone -)  10 mg PO Q8H PRN


   PRN Reason: PAIN LEVEL 4 - 6


Thiamine HCl (Vitamin B1 -)  100 mg PO BID [facility]


   Last Admin: 05/24/18 14:13 Dose:  100 mg











carotid doppler- -ve





-EKG Sinus rhythm with PVCs, prolonged QTc 467





ECHO 5/23/18: Normal LVEF, moderate concentric LVH. E/A reversal consistent but 

not diagnostic of poor LV compliance. LV wall motion normal. Trace MR, Mild SC, 

Mild TR,RV systolic pressure 40-50





Nuclear stress test (Lexiscan) 5/24/18:Normal lexiscan stress EKG, Normal 

myocardial perfusion. LV cavity is enlarged. LVEF 42%.





ASSESSMENT/PLAN:


57 year old male with a hx of HTN and anemia presents for multiple episodes of 

syncope and chest pain





#Syncopal Episodes: 


patient reports 8 within the last 2 weeks


PVCs on tele


Consult Dr. Sun appreciated


Normal stress test with LVEF-42%





#HTN


On home 0.1mg clonidine bid


On lopressor 50mg bid at home


Cont amlodipine 10mg 





#Chest pain: r/o ACS


-repeat EKG- PVCs


-Elevated creatinine kinase, normal trops


-IVF stopped, CK- decreasing, blood pressure elevated





#Chronic pain


-Pt on chronic opioids 


-Confirmed he follows a pain mx clinic in [facility]


-Continue dilaudid 4mg bid (Has one month supply of dilaudid 8mg ER -filled in 

[place] 5[place]11/18)


-Continue oxycodone 10mg Q8H


-Ativan PRN- stopped, no signs of withdrawal





#Hypernatremia: likely dehydration related


-IV NS @ 100cc/hr- stopped


-recheck BMP in AM





#WILBERTO: new in onset, creatinine 1.7 on admission


improving Cr-1.2


following rehydration and improved CK





#Alcohol Use: 


-patient says last drink was after the syncope yesterday


-Blood alcohol level above 200


stop ativan PRN, no withdrawal symptoms





#FEN


-Oral fluids, limit salt ingestion


-replete lytes as necessary in AM


-sodium controlled diet





#Prophylaxis


-heparin prophylaxis





#Dispo


Continue on Tele





Visit type





- Emergency Visit


Emergency Visit: Yes


ED Registration Date: 05/22/18


Care time: The patient presented to the Emergency Department on the above date 

and was hospitalized for further evaluation of their emergent condition.





- New Patient


This patient is new to me today: No





- Critical Care


Critical Care patient: No





- Discharge Referral


Referred to [facility] No

## 2018-05-25 VITALS — SYSTOLIC BLOOD PRESSURE: 125 MMHG | DIASTOLIC BLOOD PRESSURE: 89 MMHG | HEART RATE: 60 BPM | TEMPERATURE: 97.6 F

## 2018-05-25 LAB
ALBUMIN SERPL-MCNC: 3 G/DL (ref 3.4–5)
ALP SERPL-CCNC: 72 U/L (ref 45–117)
ALT SERPL-CCNC: 32 U/L (ref 12–78)
ANION GAP SERPL CALC-SCNC: 8 MMOL/L (ref 8–16)
AST SERPL-CCNC: 32 U/L (ref 15–37)
BASOPHILS # BLD: 1 % (ref 0–2)
BILIRUB SERPL-MCNC: 0.3 MG/DL (ref 0.2–1)
BUN SERPL-MCNC: 17 MG/DL (ref 7–18)
CALCIUM SERPL-MCNC: 8.1 MG/DL (ref 8.5–10.1)
CHLORIDE SERPL-SCNC: 107 MMOL/L (ref 98–107)
CO2 SERPL-SCNC: 27 MMOL/L (ref 21–32)
CREAT SERPL-MCNC: 1.1 MG/DL (ref 0.7–1.3)
DEPRECATED RDW RBC AUTO: 13.6 % (ref 11.9–15.9)
EOSINOPHIL # BLD: 4.6 % (ref 0–4.5)
GLUCOSE SERPL-MCNC: 88 MG/DL (ref 74–106)
HCT VFR BLD CALC: 42 % (ref 35.4–49)
HGB BLD-MCNC: 13.8 GM/DL (ref 11.7–16.9)
LYMPHOCYTES # BLD: 41.1 % (ref 8–40)
MAGNESIUM SERPL-MCNC: 2 MG/DL (ref 1.8–2.4)
MCH RBC QN AUTO: 27.7 PG (ref 25.7–33.7)
MCHC RBC AUTO-ENTMCNC: 32.9 G/DL (ref 32–35.9)
MCV RBC: 84 FL (ref 80–96)
MONOCYTES # BLD AUTO: 8.2 % (ref 3.8–10.2)
NEUTROPHILS # BLD: 45.1 % (ref 42.8–82.8)
PHOSPHATE SERPL-MCNC: 3.4 MG/DL (ref 2.5–4.9)
PLATELET # BLD AUTO: 184 K/MM3 (ref 134–434)
PMV BLD: 11.3 FL (ref 7.5–11.1)
POTASSIUM SERPLBLD-SCNC: 3.6 MMOL/L (ref 3.5–5.1)
PROT SERPL-MCNC: 6.6 G/DL (ref 6.4–8.2)
RBC # BLD AUTO: 5 M/MM3 (ref 4–5.6)
SODIUM SERPL-SCNC: 142 MMOL/L (ref 136–145)
WBC # BLD AUTO: 4.8 K/MM3 (ref 4–10)

## 2018-05-25 RX ADMIN — METOPROLOL TARTRATE SCH MG: 50 TABLET, FILM COATED ORAL at 10:23

## 2018-05-25 RX ADMIN — HEPARIN SODIUM SCH UNIT: 5000 INJECTION, SOLUTION INTRAVENOUS; SUBCUTANEOUS at 06:32

## 2018-05-25 RX ADMIN — CYANOCOBALAMIN TAB 1000 MCG SCH MCG: 1000 TAB at 10:22

## 2018-05-25 RX ADMIN — Medication SCH MG: at 10:24

## 2018-05-25 RX ADMIN — HEPARIN SODIUM SCH: 5000 INJECTION, SOLUTION INTRAVENOUS; SUBCUTANEOUS at 14:01

## 2018-05-25 RX ADMIN — FOLIC ACID SCH MG: 1 TABLET ORAL at 10:22

## 2018-05-25 NOTE — DS
Physical Exam: 


SUBJECTIVE: Patient seen and examined. No new c/o. No palpitations overnight. 

Had elevated BP that resolved with medication. Wants to go home








OBJECTIVE:





 Vital Signs











 Period  Temp  Pulse  Resp  BP Sys/Vela  Pulse Ox


 


 Last 24 Hr  98 F-98.1 F  52-65  16-20  138-181/  96-99





 Vital Signs











Temp  97.6 F   05/25/18 14:13


 


Pulse  60   05/25/18 14:13


 


Resp  16   05/25/18 14:13


 


BP  125/89   05/25/18 14:13


 


Pulse Ox  98   05/25/18 08:00











PHYSICAL EXAM





GENERAL: The patient is awake, alert, and fully oriented, in no acute distress. 

No tremors.


LUNGS: Breath sounds equal, clear to auscultation bilaterally


HEART: irregular rate and normal rhythm, S1, S2 without murmur, rub or gallop.


ABDOMEN: Soft, nontender, nondistended, normoactive bowel sounds


EXTREMITIES: 2+ pulses, warm, well-perfused, no edema. Multiple healed scars 

lower extremities


NEUROLOGICAL: Cranial nerves II through XII grossly intact. Normal speech, gait 

not 


observed.


PSYCH: Normal mood, normal affect.


SKIN: Warm, dry, normal turgor, no rashes or lesions noted





LABS


 Laboratory Results - last 24 hr











  05/24/18 05/25/18 05/25/18





  20:30 06:26 06:26


 


WBC   4.8 


 


RBC   5.00 


 


Hgb   13.8 


 


Hct   42.0 


 


MCV   84.0 


 


MCH   27.7 


 


MCHC   32.9 


 


RDW   13.6 


 


Plt Count   184 


 


MPV   11.3 H 


 


Neutrophils %   45.1 


 


Lymphocytes %   41.1 H 


 


Monocytes %   8.2 


 


Eosinophils %   4.6 H 


 


Basophils %   1.0 


 


Nucleated RBC %   0 


 


Sodium    142


 


Potassium    3.6


 


Chloride    107


 


Carbon Dioxide    27


 


Anion Gap    8


 


BUN    17


 


Creatinine    1.1


 


Creat Clearance w eGFR    > 60


 


Random Glucose    88


 


Calcium    8.1 L


 


Phosphorus    3.4


 


Magnesium    2.0


 


Total Bilirubin    0.3  D


 


AST    32


 


ALT    32


 


Alkaline Phosphatase    72


 


Total Protein    6.6


 


Albumin    3.0 L


 


Opiates Screen  Positive  


 


Methadone Screen  Negative  


 


Barbiturate Screen  Negative  


 


Phencyclidine Screen  Negative  


 


Ur Amphetamines Screen  Negative  


 


MDMA (Ecstasy) Screen  Negative  


 


Benzodiazepines Screen  Negative  


 


Cocaine Screen  Negative  


 


U Marijuana (THC) Screen  Negative  








Ambulatory Orders





Clonidine HCl 0.2 mg PO BID 04/19/16 


oxyCODONE HCL [Roxicodone -] 10 mg PO Q8H PRN  tablet MDD 30 05/11/18 


HYDROmorphone [Dilaudid -] 8 mg PO DAILY 05/23/18 


Ondansetron HCl [Zofran] 4 mg PO DAILY 05/23/18 


Amlodipine Besylate 10 mg PO DAILY #30 tablet 05/25/18 


Metoprolol Tartrate [Lopressor] 50 mg PO BID #60 tablet 05/25/18 











carotid doppler- -ve





-EKG Sinus rhythm with PVCs, prolonged QTc 467





ECHO 5/23/18: Normal LVEF, moderate concentric LVH. E/A reversal consistent but 

not diagnostic of poor LV compliance. LV wall motion normal. Trace MR, Mild LA, 

Mild TR,RV systolic pressure 40-50





Nuclear stress test (Lexiscan) 5/24/18:Normal lexiscan stress EKG, Normal 

myocardial perfusion. LV cavity is enlarged. LVEF 42%.








HOSPITAL COURSE:





Date of Admission:05/22/18





Date of Discharge: 05/25/18








57 year old male with a hx of HTN and anemia who presented with multiple 

episodes of syncope and chest pain





#Syncopal Episodes: 


Pt had up to 8 syncopal episodes within the last 2 weeks, with PVCs seen on EKG 

and PVCs on tele. Cardiology was consulted. ECHO, stress test done, see above. 

He will follow up as an outpatient with cardiology for possible catheterization.





#HTN


Pt continued on his home 0.2mg clonidine bid, lopressor 50mg bid  and then we 

added amlodipine 10mg because his DBP ran high up to the 100s.





#Chest pain: 


Pt was investigated for ACS  and it was negative for the work up, EKG- showed 

PVCs,  there were elevated creatinine kinase, which resolved with fluids, but 

normal trops.





#Chronic pain


Pt was on chronic opioids. We confirmed he follows a pain mx clinic in 

[facility], so we continued dilaudid 8mg ER -filled in [place] on 5[place]11/ 18)


and oxycodone 10mg Q8H





#Hypernatremia: 


He had an initial hypernatremia that was likely dehydration related and 

resolved with hydration.





#WILBERTO:


Pt was noted to have  creatinine 1.7 on admission that improved to 1.1 on 

discharge following hydration





#Alcohol Use: 


-Pt had blood alcohol level above 200 and said his last drink was after the 

syncope yesterday. He showed no withdrawal symptoms and PRN ativan was stopped.





He will follow with his PCP, Pain mx doctor and cardiologist as an outpatient


Minutes to complete discharge: 45





Discharge Summary


Reason For Visit: CHEST PAIN


Current Active Problems





Alcohol intoxication (Acute)


Chest pain (Acute)


Syncope (Acute)








Condition: Stable





- Instructions


Diet, Activity, Other Instructions: 


You came in after periods of chest pain and blacking out


We ran tests, including checking heart enzymes, ECHO and nuclear stress test 


that showed you did not have a heart attack 





Your blood pressure was high


We made some adjustments to your blood pressure medication


We increased the dose of your metoprolol from 25mg twice a day to 5omg twice a 

day


We added amlodipine 10mg daily


Continue your clonidine at 0.2mg twice daily


Take your blood pressure measurement daily and record measurements


Bring the records with you to see your primary care doctor and cardiologist


Notify your doctor if SBP (upper BP) persistently > 140 or < 100 or any 

dizziness or new concerns.





Continue your pain medications as prior. 


You may continue and follow up with your pain management as an outpatient


Do not drive, operate heavy machinery or take important decisions alone while 

on these medications and do not take if dizzy, drowsy or sleepy. 





Advise alcohol cessation. 





Follow up with your cardiologist as an outpatient in one week


Follow up with your primary care doctor, Or Dr Brooklynn Robert at [address] [place] [address], [place] 


Call  to make appointment for Thursday afternoon with Dr Robert in a 

week's time





If you feel your symptoms are getting worse with worsening shortness of breath 

or chest pain, please return to the emergency room











Referrals: 


Brooklynn Robert, MICKEY [Resident] - 1 Week


Justin Sood MD [Staff Physician] - 1 Week


Yohan Rojo MD [Staff Physician] - 1 Week


Disposition: HOME





- Home Medications


Comprehensive Discharge Medication List: 


Ambulatory Orders





Clonidine HCl 0.2 mg PO BID 04/19/16 


oxyCODONE HCL [Roxicodone -] 10 mg PO Q8H PRN  tablet MDD 30 05/11/18 


HYDROmorphone [Dilaudid -] 8 mg PO DAILY 05/23/18 


Ondansetron HCl [Zofran] 4 mg PO DAILY 05/23/18 


Amlodipine Besylate 10 mg PO DAILY #30 tablet 05/25/18 


Metoprolol Tartrate [Lopressor] 50 mg PO BID #60 tablet 05/25/18 








This patient is new to me today: No


Emergency Visit: Yes


ED Registration Date: 05/22/18


Care time: The patient presented to the Emergency Department on the above date 

and was hospitalized for further evaluation of their emergent condition.


Critical Care patient: No





- Discharge Referral


Referred to [facility] No

## 2018-05-25 NOTE — PN
Progress Note, Physician


History of Present Illness: 





The patient is a 57 year old male, with a significant past medical history of 

HTN (noncompliant with medication) and anemia, who presents to the emergency 

department with, s/p 2 episodes of syncope while standing with associated chest 

pain. The patient reports associated chest pain with his second episode of 

syncope. He reports associated shortness of breath associated with both 

episodes of syncope. As per patient, he has not been feeling well for the past 

4 days.  He reports consuming alcohol the past two days after a friend advised 

him to. He denies consuming alcohol today. The patient reports to be 

noncompliant with his HTN medication because he believes it makes him feel 

lightheaded.  





He denies any recent fevers, chills, or headache. He denies any recent nausea, 

vomit, diarrhea or constipation. He denies any recent dysuria, frequency, 

urgency or hematuria.








- Current Medication List


Current Medications: 


Active Medications





Amlodipine Besylate (Norvasc -)  10 mg PO DAILY [facility]


   Last Admin: 05/25/18 10:22 Dose:  10 mg


Clonidine (Catapres -)  0.1 mg PO BID [facility]


   Last Admin: 05/25/18 10:22 Dose:  0.1 mg


Cyanocobalamin (Vitamin B12 -)  1,000 mcg PO DAILY [facility]


   Last Admin: 05/25/18 10:22 Dose:  1,000 mcg


Folic Acid (Folic Acid -)  1 mg PO DAILY [facility]


   Last Admin: 05/25/18 10:22 Dose:  1 mg


Heparin Sodium (Porcine) (Heparin -)  5,000 unit SQ TID [facility]


   Last Admin: 05/25/18 06:32 Dose:  5,000 unit


Hydromorphone HCl (Dilaudid -)  4 mg PO BID [facility]


   Last Admin: 05/25/18 10:22 Dose:  4 mg


Metoprolol Tartrate (Lopressor -)  50 mg PO BID [facility]


   Last Admin: 05/25/18 10:23 Dose:  50 mg


Oxycodone HCl (Roxicodone -)  10 mg PO Q8H PRN


   PRN Reason: PAIN LEVEL 4 - 6


   Last Admin: 05/25/18 02:15 Dose:  10 mg


Thiamine HCl (Vitamin B1 -)  100 mg PO BID [facility]


   Last Admin: 05/25/18 10:24 Dose:  100 mg











- Objective


Vital Signs: 


 Vital Signs











Temperature  98.1 F   05/25/18 08:25


 


Pulse Rate  55 L  05/25/18 08:25


 


Respiratory Rate  16   05/25/18 08:25


 


Blood Pressure  142/102   05/25/18 08:25


 


O2 Sat by Pulse Oximetry (%)  98   05/25/18 08:00











Eyes: Yes: WNL, Conjunctiva Clear, EOM Intact


HENT: Yes: WNL, Atraumatic, Normocephalic


Neck: Yes: WNL, Supple, Trachea Midline


Cardiovascular: Yes: WNL, Regular Rate and Rhythm


Respiratory: Yes: WNL, Regular, CTA Bilaterally


Gastrointestinal: Yes: WNL, Normal Bowel Sounds


Genitourinary: Yes: WNL


Musculoskeletal: Yes: WNL


Extremities: Yes: WNL


Edema: No


Integumentary: Yes: WNL


Neurological: Yes: WNL, Alert, Oriented


...Motor Strength: WNL


Psychiatric: Yes: WNL


Labs: 


 CBC, BMP





 05/25/18 06:26 





 05/25/18 06:26 





 INR, PTT











INR  0.91  (0.82-1.09)   05/22/18  21:13    














Problem List





- Problems


(1) Alcohol intoxication


Code(s): F10.929 - ALCOHOL USE, UNSPECIFIED WITH INTOXICATION, UNSPECIFIED   





(2) Chest pain


Code(s): R07.9 - CHEST PAIN, UNSPECIFIED   





(3) Hypertension


Code(s): I10 - ESSENTIAL (PRIMARY) HYPERTENSION   


Qualifiers: 


   Hypertension type: essential hypertension   Qualified Code(s): I10 - 

Essential (primary) hypertension   





Assessment/Plan





syncope


r/o mi


htn


etoh use


noncompliance


plan


bp control amlodipine


check lipids


watch for DT


echo


telemetry


mibi negative


d/c telemetry 1% lidocaine
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
## 2018-05-25 NOTE — PN
Physical Exam: 


SUBJECTIVE: Patient seen and examined








OBJECTIVE:





 Vital Signs











 Period  Temp  Pulse  Resp  BP Sys/Vela  Pulse Ox


 


 Last 24 Hr  98 F-98.1 F  52-67  20-20  138-181/  97-99





 Vital Signs











Temp  98 F   05/25/18 02:00


 


Pulse  52 L  05/25/18 07:00


 


Resp  20   05/25/18 07:00


 


BP  152/99   05/25/18 07:00


 


Pulse Ox  99   05/24/18 21:00








 Intake & Output











 05/24/18 05/24/18 05/25/18





 11:59 23:59 11:59


 


Intake Total 300 500 


 


Balance 300 500 


 


Intake:   


 


   500 


 


    Normal Saline - 1,000 ml 300 500 





    @ 100 mls/hr IV ASDIR SHAYNE   





    Rx#:[id number]   


 


  IVPB  0 


 


Other:   


 


  Voiding Method Toilet Toilet Toilet


 


  # Unmeasured Voids   


 


    Void   2














GENERAL: The patient is awake, alert, and fully oriented, in no acute distress.


HEAD: Normal with no signs of trauma.


EYES: PERRL, extraocular movements intact, sclera anicteric, conjunctiva clear. 

No ptosis. 


ENT: Ears normal, nares patent, oropharynx clear without exudates, moist mucous 


membranes.


NECK: Trachea midline, full range of motion, supple. 


LUNGS: Breath sounds equal, clear to auscultation bilaterally, no wheezes, no 

crackles, no 


accessory muscle use. 


HEART: Regular rate and rhythm, S1, S2 without murmur, rub or gallop.


ABDOMEN: Soft, nontender, nondistended, normoactive bowel sounds, no guarding, 

no 


rebound, no hepatosplenomegaly, no masses.


EXTREMITIES: 2+ pulses, warm, well-perfused, no edema. 


NEUROLOGICAL: Cranial nerves II through XII grossly intact. Normal speech, gait 

not 


observed.


PSYCH: Normal mood, normal affect.


SKIN: Warm, dry, normal turgor, no rashes or lesions noted














 Laboratory Results - last 24 hr











  05/24/18 05/24/18 05/24/18





  06:10 06:10 20:30


 


Sodium   143 


 


Potassium   3.6 


 


Chloride   110 H 


 


Carbon Dioxide   27 


 


Anion Gap   6 L 


 


BUN   17 


 


Creatinine   1.2 


 


Creat Clearance w eGFR   > 60 


 


Random Glucose   83 


 


Calcium   7.4 L 


 


Phosphorus   2.9 


 


Magnesium   1.8 


 


Total Bilirubin   0.6  D 


 


AST   36  D 


 


ALT   34  D 


 


Alkaline Phosphatase   67  D 


 


Creatine Kinase   502 H 


 


Creatine Kinase Index   0.9 


 


CK-MB (CK-2)   4.852 H 


 


Total Protein   6.2 L D 


 


Albumin   2.8 L D 


 


Triglycerides  Cancelled  84 


 


Cholesterol  Cancelled  152 


 


Total LDL Cholesterol  Cancelled  85 


 


HDL Cholesterol  Cancelled  63 H 


 


Opiates Screen    Positive


 


Methadone Screen    Negative


 


Barbiturate Screen    Negative


 


Phencyclidine Screen    Negative


 


Ur Amphetamines Screen    Negative


 


MDMA (Ecstasy) Screen    Negative


 


Benzodiazepines Screen    Negative


 


Cocaine Screen    Negative


 


U Marijuana (THC) Screen    Negative








Active Medications











Generic Name Dose Route Start Last Admin





  Trade Name Freq  PRN Reason Stop Dose Admin


 


Amlodipine Besylate  10 mg  05/25/18 10:00  





  Norvasc -  PO   





  DAILY SHAYNE   





     





     





     





     


 


Clonidine  0.1 mg  05/23/18 10:00  05/24/18 21:02





  Catapres -  PO   0.1 mg





  BID SHAYNE   Administration





     





     





     





     


 


Cyanocobalamin  1,000 mcg  05/23/18 10:00  05/24/18 14:14





  Vitamin B12 -  PO   1,000 mcg





  DAILY SHAYNE   Administration





     





     





     





     


 


Folic Acid  1 mg  05/23/18 10:00  05/24/18 14:13





  Folic Acid -  PO   1 mg





  DAILY SHAYNE   Administration





     





     





     





     


 


Heparin Sodium (Porcine)  5,000 unit  05/24/18 14:00  05/25/18 06:32





  Heparin -  SQ   5,000 unit





  TID SHAYNE   Administration





     





     





     





     


 


Hydromorphone HCl  4 mg  05/24/18 10:56  05/24/18 21:02





  Dilaudid -  PO   4 mg





  BID SHAYNE   Administration





     





     





     





     


 


Metoprolol Tartrate  50 mg  05/22/18 23:30  05/24/18 21:02





  Lopressor -  PO   50 mg





  BID SHAYNE   Administration





     





     





     





     


 


Oxycodone HCl  10 mg  05/24/18 10:55  05/25/18 02:15





  Roxicodone -  PO   10 mg





  Q8H PRN   Administration





  PAIN LEVEL 4 - 6   





     





     





     


 


Thiamine HCl  100 mg  05/23/18 10:00  05/24/18 21:03





  Vitamin B1 -  PO   100 mg





  BID SHAYNE   Administration





     





     





     





     











ASSESSMENT/PLAN: